# Patient Record
Sex: FEMALE | Race: WHITE
[De-identification: names, ages, dates, MRNs, and addresses within clinical notes are randomized per-mention and may not be internally consistent; named-entity substitution may affect disease eponyms.]

---

## 2018-07-05 ENCOUNTER — HOSPITAL ENCOUNTER (OUTPATIENT)
Dept: HOSPITAL 75 - PREOP | Age: 38
End: 2018-07-05
Attending: SURGERY
Payer: MEDICAID

## 2018-07-05 VITALS — BODY MASS INDEX: 23.92 KG/M2 | HEIGHT: 62 IN | WEIGHT: 130 LBS

## 2018-07-05 DIAGNOSIS — Z01.818: Primary | ICD-10-CM

## 2018-07-06 ENCOUNTER — HOSPITAL ENCOUNTER (OUTPATIENT)
Dept: HOSPITAL 75 - SDC | Age: 38
Discharge: HOME | End: 2018-07-06
Attending: SURGERY
Payer: MEDICAID

## 2018-07-06 VITALS — BODY MASS INDEX: 23.92 KG/M2 | HEIGHT: 62 IN | WEIGHT: 130 LBS

## 2018-07-06 VITALS — DIASTOLIC BLOOD PRESSURE: 91 MMHG | SYSTOLIC BLOOD PRESSURE: 129 MMHG

## 2018-07-06 VITALS — SYSTOLIC BLOOD PRESSURE: 124 MMHG | DIASTOLIC BLOOD PRESSURE: 76 MMHG

## 2018-07-06 VITALS — SYSTOLIC BLOOD PRESSURE: 118 MMHG | DIASTOLIC BLOOD PRESSURE: 82 MMHG

## 2018-07-06 DIAGNOSIS — L72.0: Primary | ICD-10-CM

## 2018-07-06 PROCEDURE — 84703 CHORIONIC GONADOTROPIN ASSAY: CPT

## 2018-07-06 PROCEDURE — 87081 CULTURE SCREEN ONLY: CPT

## 2018-07-06 NOTE — DISCHARGE INST-SIMPLE/STANDARD
Discharge Inst-Standard


Discharge Medications


New, Converted or Re-Newed RX:  RX on Chart





Patient Instructions/Follow Up


Plan of Care/Instructions/FU:  


Dressing off in48 hours. F/U with my nurse in 10 days for suture removal


Activity as Tolerated:  Yes


Discharge Diet:  No Restrictions











ALYSSA PALENCIA MD Jul 6, 2018 9:15 am

## 2018-07-06 NOTE — PROGRESS NOTE-PRE OPERATIVE
Pre-Operative Progress Note


H&P Reviewed


The H&P was reviewed, patient examined and no changes noted.


Date Seen by Provider:  Jun 26, 2018


Time Seen by Provider:  09:10


Date H&P Reviewed:  Jul 6, 2018


Time H&P Reviewed:  09:10


Pre-Operative Diagnosis:  Recurrent sebaceous cyst-Back











ALYSSA PALENCIA MD Jul 6, 2018 9:10 am

## 2018-07-06 NOTE — OPERATIVE REPORT
Operative Report


Date of Procedure/Surgery


Jul 6, 2018


Surgeon (s)


ALYSSA PALENCIA MD


Assistant (s):  Carline Foreman (Med Student)





Post-Operative Diagnosis





same





Procedure Performed





excision





Description of Procedure


Anesthesia Type:  Block


Estimated blood loss (mL):  minimal


Specimen(s) collected/removed


sebaceous cyst


Description of the Procedure


Indication for the procedure: This lady presented with a sebaceous cyst over 

her mid back, slightly superior to where she had undergone excision of a 

sebaceous cyst, about 2 years ago.  At her request, she was offered excision 

under local anesthetic.  Informed consent was obtained after reviewing the 

procedure and complications of postoperative hematoma, wound infection and 

recurrence of the sebaceous cyst.





Description of procedure: She was placed in right lateral decubitus position 

and the area prepared and draped in the usual sterile manner. Local anesthesia 

was achieved using a combination of 1 percent lidocaine and 0.5 percent 

Marcaine with epinephrine.





An elliptical incision about 4 cm long was made and the cyst excised intact.  

Hemostasis was achieved using cautery and the incision closed using 3-0 Vicryl 

for the deeper layer and 4-0 nylon for skin, in an interrupted fashion.





A nonadherent dressing was then applied.





She tolerated the procedure well and was taken to the recovery room in a stable 

condition


Findings of the Procedure


See op report





Allergies and Home Medications


Allergies


Coded Allergies:  


     No Known Drug Allergies (Unverified , 1/25/16)





Home Medications


Tramadol HCl 50 Mg Tablet, 50 MG PO Q12H


   Prescribed by: ALYSSA PALENCIA on 7/6/18 0914





Patient Home Medication List


Home Medication List Reviewed:  Yes











ALYSSA PALENCIA MD Jul 6, 2018 9:51 am